# Patient Record
Sex: FEMALE
[De-identification: names, ages, dates, MRNs, and addresses within clinical notes are randomized per-mention and may not be internally consistent; named-entity substitution may affect disease eponyms.]

---

## 2018-10-30 ENCOUNTER — HOSPITAL ENCOUNTER (OUTPATIENT)
Dept: HOSPITAL 88 - OR | Age: 43
Discharge: HOME | End: 2018-10-30
Attending: INTERNAL MEDICINE
Payer: COMMERCIAL

## 2018-10-30 VITALS — DIASTOLIC BLOOD PRESSURE: 75 MMHG | SYSTOLIC BLOOD PRESSURE: 106 MMHG

## 2018-10-30 DIAGNOSIS — K90.9: ICD-10-CM

## 2018-10-30 DIAGNOSIS — D12.3: ICD-10-CM

## 2018-10-30 DIAGNOSIS — D57.3: ICD-10-CM

## 2018-10-30 DIAGNOSIS — E03.9: ICD-10-CM

## 2018-10-30 DIAGNOSIS — K64.8: ICD-10-CM

## 2018-10-30 DIAGNOSIS — K44.9: ICD-10-CM

## 2018-10-30 DIAGNOSIS — K64.4: ICD-10-CM

## 2018-10-30 DIAGNOSIS — K21.0: ICD-10-CM

## 2018-10-30 DIAGNOSIS — R12: Primary | ICD-10-CM

## 2018-10-30 DIAGNOSIS — K29.70: ICD-10-CM

## 2018-10-30 DIAGNOSIS — Z88.6: ICD-10-CM

## 2018-10-30 PROCEDURE — 81025 URINE PREGNANCY TEST: CPT

## 2018-10-30 PROCEDURE — 43239 EGD BIOPSY SINGLE/MULTIPLE: CPT

## 2018-10-30 PROCEDURE — 45384 COLONOSCOPY W/LESION REMOVAL: CPT

## 2018-10-30 NOTE — XMS REPORT
Patient Summary Document

                             Created on: 10/30/2018



JOSÉ MIGUEL ROSALES

External Reference #: [id number]

: 1975

Sex: Female



Demographics







                          Address                   [address] 

[place]  [address]

 

                          Home Phone                [phone]

 

                          Preferred Language        Unknown

 

                          Marital Status            Unknown

 

                          Orthodoxy Affiliation     Unknown

 

                          Race                      Unknown

 

                                        Additional Race(s)  

 

                          Ethnic Group              Unknown





Author







                          Author                    [facility]

 

                          Address                   Unknown

 

                          Phone                     Unavailable







Support







                Name            Relationship    Address         Phone

 

                    ELANA NAVA       PRS                 [address] [place], [place]  [address] [phone]

 

                    ELANA POLANCO       PRS                 [address] [place]  [address]                     [phone]







Care Team Providers







                    Care Team Member Name    Role                Phone

 

                          Unavailable               Unavailable







Payers







             Payer Name    Policy Type    Policy Number    Effective Date    Expiration Date







Problems

This patient has no known problems.



Allergies, Adverse Reactions, Alerts







          Allergy Name    Allergy Type    Status    Severity    Reaction(s)    Onset Date    Inactive 

Date                      Treating Clinician        Comments

 

        tramadol    DA      Active    SV              2018 00:00:00                     

 

        tramadol    DA      Active    SV              2016 00:00:00                     







Medications

This patient has no known medications.



Encounters







             Start Date/Time    End Date/Time    Encounter Type    Admission Type    Attending Clinicians

                    Care Facility       Care Department     Encounter ID

 

        2018 00:00:00    2018 00:00:00    Outpatient                    [facility]     [id number]

 

        2018 00:00:00    2018 00:00:00    Outpatient                    [facility]     [id number]

 

        2018 12:17:55    2018 12:17:55    Outpatient                    [facility]     [id number]

 

        2018 00:00:00    2018 00:00:00    Outpatient                    [facility]     [id number]

 

        2018 00:00:00    2018 00:00:00    Outpatient                    [facility]     [id number]

 

        2018 10:05:09    2018 10:05:09    Outpatient                    [facility]     [id number]

 

        2018-04-10 00:00:00    2018-04-10 00:00:00    Outpatient                    [facility]     [id number]

 

        2018 09:25:47    2018 09:25:47    Outpatient                    [facility]     [id number]

 

        2018 00:00:00    2018 00:00:00    Outpatient                    [facility]     [id number]

 

        2018 00:00:00    2018 00:00:00    Outpatient                    [facility]     [id number]

 

        2018 00:00:00    2018 00:00:00    Outpatient                    [facility]     [id number]

 

        2018 08:31:28    2018 08:31:28    Outpatient                    [facility]     [id number]

 

        2018 13:22:44    2018 13:22:44    Outpatient                    [facility]     [id number]

 

        2018 10:15:25    2018 10:15:25    Outpatient                    [facility]     [id number]

 

        2018 00:00:00    2018 00:00:00    Outpatient                    [facility]     [id number]

 

        2017 09:52:56    2017 09:52:56    Outpatient                    [facility]     [id number]

 

        2017 09:20:55    2017 09:20:55    Outpatient                    [facility]     [id number]

 

        2017 07:58:58    2017 07:58:58    Outpatient                    [facility]     [id number]

 

        2017-10-16 15:11:13    2017-10-16 15:11:13    Outpatient                    [facility]     [id number]

 

        2017-10-16 14:39:34    2017-10-16 14:39:34    Outpatient                    [facility]     [id number]

 

        2017 09:32:04    2017 09:32:04    Outpatient                    [facility]     [id number]

 

        2017 10:43:06    2017 10:43:06    Outpatient                    [facility]     [id number]

 

        2017 00:00:00    2017 00:00:00    Outpatient                    [facility]     [id number]

 

        2017 13:34:12    2017 13:34:12    Outpatient                    [facility]     [id number]

 

        2017 15:39:55    2017 15:39:55    Outpatient                    [facility]     [id number]

 

        2017 14:31:56    2017 14:31:56    Outpatient                    [facility]     [id number]

 

        2017 00:00:00    2017 00:00:00    Outpatient                    [facility]     [id number]

 

        2017 12:04:02    2017 12:04:02    Outpatient                    [facility]     [id number]

## 2022-02-28 ENCOUNTER — HOSPITAL ENCOUNTER (OUTPATIENT)
Dept: HOSPITAL 88 - RAD | Age: 47
End: 2022-02-28
Attending: FAMILY MEDICINE
Payer: COMMERCIAL

## 2022-02-28 DIAGNOSIS — I83.93: Primary | ICD-10-CM

## 2022-02-28 PROCEDURE — 93970 EXTREMITY STUDY: CPT
